# Patient Record
Sex: FEMALE | Race: BLACK OR AFRICAN AMERICAN | URBAN - METROPOLITAN AREA
[De-identification: names, ages, dates, MRNs, and addresses within clinical notes are randomized per-mention and may not be internally consistent; named-entity substitution may affect disease eponyms.]

---

## 2017-11-07 ENCOUNTER — HOSPITAL ENCOUNTER (OUTPATIENT)
Dept: MAMMOGRAPHY | Age: 42
Discharge: HOME | End: 2017-11-07
Attending: NURSE PRACTITIONER | Admitting: NURSE PRACTITIONER

## 2017-11-07 DIAGNOSIS — Z12.31 VISIT FOR SCREENING MAMMOGRAM: ICD-10-CM

## 2018-03-12 ENCOUNTER — OFFICE VISIT (OUTPATIENT)
Dept: ORTHOPEDIC SURGERY | Age: 43
End: 2018-03-12

## 2018-03-12 VITALS
DIASTOLIC BLOOD PRESSURE: 89 MMHG | SYSTOLIC BLOOD PRESSURE: 149 MMHG | HEIGHT: 62 IN | WEIGHT: 293 LBS | HEART RATE: 81 BPM | BODY MASS INDEX: 53.92 KG/M2

## 2018-03-12 DIAGNOSIS — M17.12 OSTEOARTHRITIS OF LEFT KNEE, UNSPECIFIED OSTEOARTHRITIS TYPE: ICD-10-CM

## 2018-03-12 DIAGNOSIS — S80.02XA CONTUSION OF LEFT KNEE, INITIAL ENCOUNTER: Primary | ICD-10-CM

## 2018-03-12 DIAGNOSIS — M25.562 LEFT KNEE PAIN, UNSPECIFIED CHRONICITY: ICD-10-CM

## 2018-03-12 PROCEDURE — 20610 DRAIN/INJ JOINT/BURSA W/O US: CPT | Performed by: ORTHOPAEDIC SURGERY

## 2018-03-12 PROCEDURE — 99203 OFFICE O/P NEW LOW 30 MIN: CPT | Performed by: ORTHOPAEDIC SURGERY

## 2018-03-12 RX ORDER — IBUPROFEN 800 MG/1
800 TABLET ORAL 2 TIMES DAILY WITH MEALS
Qty: 60 TABLET | Refills: 0 | Status: SHIPPED | OUTPATIENT
Start: 2018-03-12 | End: 2018-04-24 | Stop reason: SDUPTHER

## 2018-03-12 NOTE — PROGRESS NOTES
Depo-Medrol:  NDC: [id number]  Lot #: [id number]  Expiration Date: 2/19
valgus stressing of the knees reveals no evidence of instability. There is a small effusion in the left knee. Anterior drawer and Lachman are negative bilaterally. Examination of the skin reveals no rashes, ulceration, or lesion, bilaterally in the lower extremities. Sensation to both lower extremities is grossly intact. Exam of both feet reveals pedal pulses intact and brisk cap refill. Patient is able to dorsiflex and wiggle all toes. Deep tendon reflexes of the lower extremities are normal and symmetric. X-rays: 2 views of the left knee obtained in the emergency room were extensively reviewed. X-rays confirm moderate tricompartmental osteoarthritis. 2 views of the left knee obtained in the office today were extensively reviewed. X-rays confirm at least moderate osteoarthritis of the left knee. The changes are most severe within the medial and patellofemoral compartments with osteophyte formation noted grade    Impression: #1 left knee contusion #2 left knee osteoarthritis    Plan: At this time, the left knee was injected under sterile conditions. After a Betadine prep of the joint, 3 cc of 0.25% Marcaine and 2 cc of 40 mg Depo-medrol were injected into the knee. The patient tolerated the injection rather well. The patient was instructed to follow up for any signs of infection. Natural history and expected course discussed. Questions answered. Reduction in offending activity. A prescription for ibuprofen was sent into her pharmacy to be taken twice daily with food. OTC analgesics as needed. The patient will follow up with me in 6 weeks.

## 2018-04-03 ENCOUNTER — HOSPITAL ENCOUNTER (OUTPATIENT)
Dept: WOMENS IMAGING | Age: 43
Discharge: HOME | End: 2018-04-03
Attending: ADVANCED PRACTICE MIDWIFE | Admitting: ADVANCED PRACTICE MIDWIFE

## 2018-04-03 DIAGNOSIS — Z12.31 VISIT FOR SCREENING MAMMOGRAM: ICD-10-CM

## 2018-04-25 RX ORDER — IBUPROFEN 800 MG/1
800 TABLET ORAL 2 TIMES DAILY WITH MEALS
Qty: 60 TABLET | Refills: 0 | Status: SHIPPED | OUTPATIENT
Start: 2018-04-25 | End: 2018-05-22 | Stop reason: SDUPTHER

## 2018-05-23 RX ORDER — IBUPROFEN 800 MG/1
800 TABLET ORAL 2 TIMES DAILY WITH MEALS
Qty: 60 TABLET | Refills: 0 | Status: SHIPPED | OUTPATIENT
Start: 2018-05-23 | End: 2018-06-29 | Stop reason: SDUPTHER

## 2018-07-02 RX ORDER — IBUPROFEN 800 MG/1
800 TABLET ORAL 2 TIMES DAILY WITH MEALS
Qty: 60 TABLET | Refills: 0 | Status: SHIPPED | OUTPATIENT
Start: 2018-07-02 | End: 2018-08-15 | Stop reason: SDUPTHER

## 2018-08-16 RX ORDER — IBUPROFEN 800 MG/1
800 TABLET ORAL 2 TIMES DAILY WITH MEALS
Qty: 60 TABLET | Refills: 0 | Status: SHIPPED | OUTPATIENT
Start: 2018-08-16 | End: 2018-09-10 | Stop reason: SDUPTHER

## 2018-09-12 RX ORDER — IBUPROFEN 800 MG/1
800 TABLET ORAL 2 TIMES DAILY WITH MEALS
Qty: 60 TABLET | Refills: 1 | Status: SHIPPED | OUTPATIENT
Start: 2018-09-12 | End: 2018-09-20 | Stop reason: SDUPTHER

## 2018-09-20 ENCOUNTER — TELEPHONE (OUTPATIENT)
Dept: ORTHOPEDIC SURGERY | Age: 43
End: 2018-09-20

## 2018-09-20 RX ORDER — IBUPROFEN 800 MG/1
800 TABLET ORAL 2 TIMES DAILY WITH MEALS
Qty: 60 TABLET | Refills: 2 | Status: SHIPPED | OUTPATIENT
Start: 2018-09-20